# Patient Record
Sex: FEMALE | Race: WHITE
[De-identification: names, ages, dates, MRNs, and addresses within clinical notes are randomized per-mention and may not be internally consistent; named-entity substitution may affect disease eponyms.]

---

## 2019-07-26 ENCOUNTER — HOSPITAL ENCOUNTER (OUTPATIENT)
Dept: HOSPITAL 75 - RAD | Age: 59
End: 2019-07-26
Attending: FAMILY MEDICINE
Payer: COMMERCIAL

## 2019-07-26 DIAGNOSIS — M16.11: Primary | ICD-10-CM

## 2019-07-26 NOTE — DIAGNOSTIC IMAGING REPORT
CLINICAL INDICATION: Patient with chronic right hip pain. No

known injury.



EXAM: X-ray of the pelvis AP view and X-ray of the right hip, AP

and frog-leg views.



COMPARISON: None.



FINDINGS: There is no acute fracture or dislocation. There is

mild spurring of the right proximal femoral head/neck junction

region. There is moderate narrowing of the femoroacetabular joint

space superiorly. There is spurring of the right acetabular roof.

There is mild enthesopathy of the right greater trochanter. Left

hip shows no significant abnormality. There is mild degenerative

changes with sclerosis and subchondral cystic changes involving

the symphysis pubis. Sacroiliac joints and visualized portions of

the sacrum and remainder of the pelvis is unremarkable.



IMPRESSION:



1: There is no acute fracture or dislocation.



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

hip.



Dictated by: 



  Dictated on workstation # [id number]